# Patient Record
Sex: MALE | Race: WHITE | NOT HISPANIC OR LATINO | Employment: FULL TIME | URBAN - METROPOLITAN AREA
[De-identification: names, ages, dates, MRNs, and addresses within clinical notes are randomized per-mention and may not be internally consistent; named-entity substitution may affect disease eponyms.]

---

## 2019-07-08 ENCOUNTER — HOSPITAL ENCOUNTER (EMERGENCY)
Facility: HOSPITAL | Age: 19
Discharge: HOME | End: 2019-07-08
Attending: EMERGENCY MEDICINE
Payer: MEDICAID

## 2019-07-08 VITALS
HEIGHT: 73 IN | OXYGEN SATURATION: 98 % | TEMPERATURE: 98 F | DIASTOLIC BLOOD PRESSURE: 77 MMHG | HEART RATE: 98 BPM | BODY MASS INDEX: 25.08 KG/M2 | WEIGHT: 189.25 LBS | RESPIRATION RATE: 19 BRPM | SYSTOLIC BLOOD PRESSURE: 124 MMHG

## 2019-07-08 DIAGNOSIS — T67.9XXA HEAT EXPOSURE, INITIAL ENCOUNTER: Primary | ICD-10-CM

## 2019-07-08 PROCEDURE — 99281 EMR DPT VST MAYX REQ PHY/QHP: CPT

## 2019-07-09 NOTE — ED PROVIDER NOTES
SCRIBE #1 NOTE: I, Gerber Giron, am scribing for, and in the presence of, TANVIR Mccabe Jr.. I have scribed the entire note.       History     Chief Complaint   Patient presents with    Heat Exposure     Review of patient's allergies indicates:  No Known Allergies      History of Present Illness     HPI    7/8/2019, 7:08 PM  History obtained from the patient      History of Present Illness: Chencho REAGAN Beena is a 18 y.o. male patient who presents to the Emergency Department for evaluation of heat exposure which onset gradually today. Symptoms are constant and moderate in severity. No mitigating or exacerbating factors reported. Associated sxs include HA, mild muscle cramps. Patient denies any CP, SOB, fever, chills, and all other sxs at this time. No prior Tx reported. No further complaints or concerns at this time.         Arrival mode: Personal vehicle    PCP: Primary Doctor No       Past Medical History:  History reviewed. No pertinent medical history.    Past Surgical History:  History reviewed. No pertinent surgical history.    Family History:  History reviewed. No pertinent family history.    Social History:  Social History Main Topics    Smoking status: Unknown if ever smoked    Smokeless tobacco: Unknown if ever used    Alcohol Use: Unknown drinking history    Drug Use: Unknown if ever used    Sexual Activity: Unknown        Review of Systems     Review of Systems   Constitutional: Negative for fever.        (+) heat exposure   HENT: Negative for sore throat.    Respiratory: Negative for shortness of breath.    Cardiovascular: Negative for chest pain.   Gastrointestinal: Negative for nausea.   Genitourinary: Negative for dysuria.   Musculoskeletal: Negative for back pain.        (+) muscle cramps   Skin: Negative for rash.   Neurological: Positive for headaches. Negative for weakness.   Hematological: Does not bruise/bleed easily.   All other systems reviewed and are negative.        "Physical Exam     Initial Vitals [07/08/19 1849]   BP Pulse Resp Temp SpO2   124/77 98 19 98.2 °F (36.8 °C) 98 %      MAP       --          Physical Exam  Nursing Notes and Vital Signs Reviewed.  Constitutional: Patient is in no acute distress. Well-developed and well-nourished.  Head: Atraumatic. Normocephalic.  Eyes: PERRL. EOM intact. Conjunctivae are not pale. No scleral icterus.  ENT: Mucous membranes are moist. Oropharynx is clear and symmetric.    Neck: Supple. Full ROM. No lymphadenopathy.  Cardiovascular: Regular rate. Regular rhythm. No murmurs, rubs, or gallops. Distal pulses are 2+ and symmetric.  Pulmonary/Chest: No respiratory distress. Clear to auscultation bilaterally. No wheezing or rales.  Abdominal: Soft and non-distended.  There is no tenderness.  No rebound, guarding, or rigidity. Good bowel sounds.  Genitourinary: No CVA tenderness  Musculoskeletal: Moves all extremities. No obvious deformities. No edema. No calf tenderness.  Skin: Warm and dry.  Neurological:  Alert, awake, and appropriate.  Normal speech.  No acute focal neurological deficits are appreciated.  Psychiatric: Normal affect. Good eye contact. Appropriate in content.     ED Course   Procedures  ED Vital Signs:  Vitals:    07/08/19 1849   BP: 124/77   Pulse: 98   Resp: 19   Temp: 98.2 °F (36.8 °C)   TempSrc: Oral   SpO2: 98%   Weight: 85.9 kg (189 lb 4.2 oz)   Height: 6' 1" (1.854 m)       Abnormal Lab Results:  Labs Reviewed - No data to display     All Lab Results:  None    Imaging Results:  Imaging Results    None                   The Emergency Provider reviewed the vital signs and test results, which are outlined above.     ED Discussion     7:12 PM: Reassessed pt at this time. Pt is refusing work up and prefers to return home and continue hydrating. Pt states his condition has improved at this time. Discussed with pt all pertinent ED information and results. Discussed pt dx and plan of tx. Gave pt all f/u and return to the ED " instructions. All questions and concerns were addressed at this time. Pt expresses understanding of information and instructions, and is comfortable with plan to discharge. Pt is stable for discharge.    I discussed with patient and/or family/caretaker that evaluation in the ED does not suggest any emergent or life threatening medical conditions requiring immediate intervention beyond what was provided in the ED, and I believe patient is safe for discharge.  Regardless, an unremarkable evaluation in the ED does not preclude the development or presence of a serious of life threatening condition. As such, patient was instructed to return immediately for any worsening or change in current symptoms.      ED Medication(s):  Medications - No data to display    There are no discharge medications for this patient.      Follow-up Information     [facility]    Specialty:  Emergency Medicine  Why:  If symptoms worsen  Contact information:  [id number] [facility] [id number] [phone]                                   Scribe Attestation:   Scribe #1: I performed the above scribed service and the documentation accurately describes the services I performed. I attest to the accuracy of the note.     Attending:   Physician Attestation Statement for Scribe #1: I, TANVIR Mccabe Jr. , personally performed the services described in this documentation, as scribed by Gerber Giron, in my presence, and it is both accurate and complete.           Clinical Impression       ICD-10-CM ICD-9-CM   1. Heat exposure, initial encounter T67.9XXA 992.9       Disposition:   Disposition: Discharged  Condition: Stable         TANVIR Mccabe Jr.  07/08/19 2006